# Patient Record
Sex: FEMALE
[De-identification: names, ages, dates, MRNs, and addresses within clinical notes are randomized per-mention and may not be internally consistent; named-entity substitution may affect disease eponyms.]

---

## 2017-08-31 PROBLEM — Z00.00 ENCOUNTER FOR PREVENTIVE HEALTH EXAMINATION: Status: ACTIVE | Noted: 2017-08-31

## 2017-09-28 ENCOUNTER — APPOINTMENT (OUTPATIENT)
Dept: NEUROSURGERY | Facility: CLINIC | Age: 47
End: 2017-09-28

## 2018-01-22 ENCOUNTER — APPOINTMENT (OUTPATIENT)
Dept: DERMATOLOGY | Facility: CLINIC | Age: 48
End: 2018-01-22

## 2019-11-14 ENCOUNTER — APPOINTMENT (OUTPATIENT)
Dept: NEUROSURGERY | Facility: CLINIC | Age: 49
End: 2019-11-14

## 2020-01-06 ENCOUNTER — TRANSCRIPTION ENCOUNTER (OUTPATIENT)
Age: 50
End: 2020-01-06

## 2020-01-06 ENCOUNTER — APPOINTMENT (OUTPATIENT)
Dept: NEUROSURGERY | Facility: CLINIC | Age: 50
End: 2020-01-06
Payer: COMMERCIAL

## 2020-01-06 VITALS
HEIGHT: 60 IN | BODY MASS INDEX: 27.48 KG/M2 | WEIGHT: 140 LBS | SYSTOLIC BLOOD PRESSURE: 149 MMHG | HEART RATE: 79 BPM | DIASTOLIC BLOOD PRESSURE: 84 MMHG | TEMPERATURE: 97.4 F | RESPIRATION RATE: 16 BRPM | OXYGEN SATURATION: 98 %

## 2020-01-06 PROCEDURE — 99202 OFFICE O/P NEW SF 15 MIN: CPT

## 2020-01-08 RX ORDER — HYDROCORTISONE 25 MG/G
2.5 CREAM TOPICAL
Qty: 28 | Refills: 0 | Status: DISCONTINUED | COMMUNITY
Start: 2019-11-11

## 2020-01-08 RX ORDER — NYSTATIN 100000 [USP'U]/ML
100000 SUSPENSION ORAL
Qty: 112 | Refills: 0 | Status: DISCONTINUED | COMMUNITY
Start: 2019-11-18

## 2020-01-08 RX ORDER — ASPIRIN 325 MG/1
325 TABLET, FILM COATED ORAL
Refills: 0 | Status: ACTIVE | COMMUNITY

## 2020-01-08 RX ORDER — HYDROCORTISONE 1 %
12 CREAM (GRAM) TOPICAL
Qty: 400 | Refills: 0 | Status: ACTIVE | COMMUNITY
Start: 2019-08-16

## 2020-01-08 RX ORDER — AMLODIPINE BESYLATE 5 MG/1
5 TABLET ORAL
Qty: 90 | Refills: 0 | Status: ACTIVE | COMMUNITY
Start: 2019-10-02

## 2020-01-08 NOTE — HISTORY OF PRESENT ILLNESS
[de-identified] : She is a 49-year-old right-handed  female who was initially diagnosed with fibromuscular dysplasia after she complained of right-sided pulsatile tinnitus several years ago. A Nova study does demonstrated that the blood flow in the left internal carotid artery was 189 mL per minute. She is currently on aspirin 325 mg a day. She complains of pulsatile tinnitus on her right ear. She has no history of stroke. She denies any motor, speech, visual abnormalities.

## 2020-01-08 NOTE — CONSULT LETTER
[Dear  ___] : Dear  [unfilled], [FreeTextEntry1] : I had the pleasure of seeing Sandra Narayan in neurosurgical consultation today.  As you know, she is a 49-year-old right-handed  female who was initially diagnosed with fibromuscular dysplasia after she complained of right-sided pulsatile tinnitus several years ago.  A Nova study does demonstrate that the blood flow in the left internal carotid artery is 189 mL per minute.  She is currently on aspirin 325 mg a day.  I recommended repeat imaging with quantitative MR angiography with Nova.  Once she has this completed, we will see her back in the office.\par \par  \par \par Thank you for allowing me to participate in the care of this pleasant patient.  If you have any questions, please do not hesitate to contact me.\par  [FreeTextEntry3] : \par Sincerely,\par \par Tj Belcher MD\par Professor of Neurological Surgery and Radiology\par  Department of Neurosurgery\par Director Cerebrovascular Surgery\par [facility] [employer] [facility]\par

## 2020-01-08 NOTE — ASSESSMENT
[FreeTextEntry1] : Impression: 49yr old female with fibromuscular dysplasia, left carotid artery stenosis \par \par Plan:\par Patient had prior MRA NOVA done in 2015 which showed the flow in the left internal carotid artery was low.\par MRA NOVA now to evaluate the blood flow in the left internal carotid artery due to the stenosis/fmd\par The pulsatile tinnitus in the right side is the normal carotid artery blood flow becoming turbulent. \par Follow up in our office after the new MRA NOVA is done.

## 2020-01-08 NOTE — PHYSICAL EXAM
[General Appearance - In No Acute Distress] : in no acute distress [General Appearance - Alert] : alert [Person] : oriented to person [Place] : oriented to place [Time] : oriented to time [Motor Strength] : muscle strength was normal in all four extremities [Involuntary Movements] : no involuntary movements were seen [] : no respiratory distress [Abnormal Walk] : normal gait

## 2021-07-01 ENCOUNTER — APPOINTMENT (OUTPATIENT)
Dept: NEUROSURGERY | Facility: CLINIC | Age: 51
End: 2021-07-01
Payer: COMMERCIAL

## 2021-07-01 VITALS
TEMPERATURE: 97.5 F | BODY MASS INDEX: 22.5 KG/M2 | OXYGEN SATURATION: 100 % | WEIGHT: 140 LBS | HEART RATE: 67 BPM | SYSTOLIC BLOOD PRESSURE: 166 MMHG | DIASTOLIC BLOOD PRESSURE: 107 MMHG | HEIGHT: 66 IN

## 2021-07-01 DIAGNOSIS — I65.29 OCCLUSION AND STENOSIS OF UNSPECIFIED CAROTID ARTERY: ICD-10-CM

## 2021-07-01 PROCEDURE — 99212 OFFICE O/P EST SF 10 MIN: CPT

## 2021-07-01 PROCEDURE — 99072 ADDL SUPL MATRL&STAF TM PHE: CPT

## 2021-07-12 NOTE — REVIEW OF SYSTEMS
[As Noted in HPI] : as noted in HPI [Negative] : Heme/Lymph [de-identified] : headaches, right side pulsatile tinnitus

## 2021-07-12 NOTE — REASON FOR VISIT
[Follow-Up: _____] : a [unfilled] follow-up visit [FreeTextEntry1] : She is a 50-year-old right-handed  female who was initially diagnosed with fibromuscular dysplasia in 2016 after she complained of right-sided pulsatile tinnitus several years ago. A Nova study does demonstrated that the blood flow in the left internal carotid artery was 189 mL per minute at that time. She is currently on aspirin 325 mg a day. She complains of pulsatile tinnitus on her right ear. She has no history of stroke. She denies any motor, speech, visual abnormalities. \par  TOday she presents feeling well denies any new symptoms.

## 2022-01-02 ENCOUNTER — OUTPATIENT (OUTPATIENT)
Dept: OUTPATIENT SERVICES | Facility: HOSPITAL | Age: 52
LOS: 1 days | End: 2022-01-02
Payer: COMMERCIAL

## 2022-01-02 DIAGNOSIS — Z20.828 CONTACT WITH AND (SUSPECTED) EXPOSURE TO OTHER VIRAL COMMUNICABLE DISEASES: ICD-10-CM

## 2022-01-02 LAB — SARS-COV-2 RNA SPEC QL NAA+PROBE: SIGNIFICANT CHANGE UP

## 2022-01-02 PROCEDURE — U0003: CPT

## 2022-01-02 PROCEDURE — C9803: CPT

## 2022-01-02 PROCEDURE — U0005: CPT

## 2022-01-03 DIAGNOSIS — Z20.828 CONTACT WITH AND (SUSPECTED) EXPOSURE TO OTHER VIRAL COMMUNICABLE DISEASES: ICD-10-CM

## 2022-07-28 NOTE — REASON FOR VISIT
Duration Of Freeze Thaw-Cycle (Seconds): 0
Show Aperture Variable?: Yes
Consent: The patient's consent was obtained including but not limited to risks of crusting, scabbing, blistering, scarring, darker or lighter pigmentary change, recurrence, incomplete removal and infection.
Detail Level: Detailed
Render Note In Bullet Format When Appropriate: No
Post-Care Instructions: I reviewed with the patient in detail post-care instructions. Patient is to wear sunprotection, and avoid picking at any of the treated lesions. Pt may apply Vaseline to crusted or scabbing areas.
[New Patient Visit] : a new patient visit